# Patient Record
Sex: MALE | Race: WHITE | Employment: FULL TIME | URBAN - METROPOLITAN AREA
[De-identification: names, ages, dates, MRNs, and addresses within clinical notes are randomized per-mention and may not be internally consistent; named-entity substitution may affect disease eponyms.]

---

## 2017-09-19 PROBLEM — R10.30 GROIN PAIN: Status: ACTIVE | Noted: 2017-09-19

## 2019-12-12 ENCOUNTER — HOSPITAL ENCOUNTER (OUTPATIENT)
Age: 59
Discharge: HOME | End: 2019-12-12
Payer: COMMERCIAL

## 2019-12-12 LAB
APTT: 24.6 SECONDS (ref 25.1–37.1)
FIBRINOGEN LEVEL: 344 MG/DL (ref 196.9–442.1)
HOMOCYSTEINE: ABNORMAL UMOL/L (ref 0–10)
INR BLD: 0.88 INDEX
PROTHROMBIN TIME: 10.6 SECONDS (ref 11.7–14.5)

## 2019-12-12 PROCEDURE — 83090 ASSAY OF HOMOCYSTEINE: CPT

## 2019-12-12 PROCEDURE — 81240 F2 GENE: CPT

## 2019-12-12 PROCEDURE — 86147 CARDIOLIPIN ANTIBODY EA IG: CPT

## 2019-12-12 PROCEDURE — 81241 F5 GENE: CPT

## 2019-12-12 PROCEDURE — 85303 CLOT INHIBIT PROT C ACTIVITY: CPT

## 2019-12-12 PROCEDURE — 85305 CLOT INHIBIT PROT S TOTAL: CPT

## 2019-12-12 PROCEDURE — 85384 FIBRINOGEN ACTIVITY: CPT

## 2019-12-12 PROCEDURE — 85730 THROMBOPLASTIN TIME PARTIAL: CPT

## 2019-12-12 PROCEDURE — 81291 MTHFR GENE: CPT

## 2019-12-12 PROCEDURE — 85300 ANTITHROMBIN III ACTIVITY: CPT

## 2019-12-12 PROCEDURE — 36415 COLL VENOUS BLD VENIPUNCTURE: CPT

## 2019-12-12 PROCEDURE — 86148 ANTI-PHOSPHOLIPID ANTIBODY: CPT

## 2019-12-12 PROCEDURE — 85610 PROTHROMBIN TIME: CPT

## 2019-12-12 PROCEDURE — 85240 CLOT FACTOR VIII AHG 1 STAGE: CPT

## 2019-12-25 LAB
ANTICARDIOLIPIN IGA ANTIBODY: 0 APL (ref 0–11)
ANTICARDIOLIPIN IGA ANTIBODY: ABNORMAL APL (ref 0–11)
ANTICARDIOLIPIN IGG ANTIBODY: 7 GPL (ref 0–14)
ANTICARDIOLIPIN IGG ANTIBODY: ABNORMAL GPL (ref 0–14)
ANTITHROMBIN ACTIVITY: 119 % (ref 76–128)
ANTITHROMBIN ACTIVITY: NORMAL % (ref 76–128)
CARDIOLIPIN AB IGM: 6 MPL (ref 0–12)
CARDIOLIPIN AB IGM: ABNORMAL MPL (ref 0–12)
FACTOR V LEIDEN: NEGATIVE
FACTOR V LEIDEN: NORMAL
FACTOR VIII ACTIVITY: 258 % (ref 56–191)
FACTOR VIII ACTIVITY: ABNORMAL % (ref 56–191)
MTHFR BY PCR SPECIMEN: NORMAL
MTHFR INTERPRETATION: NORMAL
MTHFR INTERPRETATION: NORMAL
MTHFR MUTATION A1298C: NEGATIVE
MTHFR MUTATION C677T: NEGATIVE
PHOSPHATIDYLSERINE IGA ANTIBODY: 2 U/ML (ref 0–19)
PHOSPHATIDYLSERINE IGA ANTIBODY: ABNORMAL U/ML (ref 0–19)
PHOSPHATIDYLSERINE IGG ANTIBODY: 6 U/ML (ref 0–10)
PHOSPHATIDYLSERINE IGG ANTIBODY: ABNORMAL U/ML (ref 0–10)
PHOSPHATIDYLSERINE IGM ANTIBODY: 9 U/ML (ref 0–24)
PHOSPHATIDYLSERINE IGM ANTIBODY: ABNORMAL U/ML (ref 0–24)
PROTEIN C ACTIVITY: 127 % (ref 83–168)
PROTEIN C ACTIVITY: NORMAL % (ref 83–168)
PROTEIN S ACTIVITY: 77 % (ref 66–143)
PROTEIN S ACTIVITY: NORMAL % (ref 66–143)
PROTHROMBIN G20210A MUTATION: NEGATIVE
PROTHROMBIN G20210A MUTATION: NORMAL

## 2020-08-30 ENCOUNTER — APPOINTMENT (OUTPATIENT)
Dept: GENERAL RADIOLOGY | Age: 60
End: 2020-08-30
Payer: COMMERCIAL

## 2020-08-30 ENCOUNTER — HOSPITAL ENCOUNTER (EMERGENCY)
Age: 60
Discharge: HOME | End: 2020-08-30
Attending: EMERGENCY MEDICINE
Payer: COMMERCIAL

## 2020-08-30 VITALS
OXYGEN SATURATION: 96 % | WEIGHT: 170 LBS | SYSTOLIC BLOOD PRESSURE: 136 MMHG | TEMPERATURE: 98.2 F | BODY MASS INDEX: 23.03 KG/M2 | RESPIRATION RATE: 16 BRPM | HEIGHT: 72 IN | DIASTOLIC BLOOD PRESSURE: 88 MMHG | HEART RATE: 83 BPM

## 2020-08-30 PROCEDURE — 6370000000 HC RX 637 (ALT 250 FOR IP): Performed by: EMERGENCY MEDICINE

## 2020-08-30 PROCEDURE — 99283 EMERGENCY DEPT VISIT LOW MDM: CPT

## 2020-08-30 PROCEDURE — 73140 X-RAY EXAM OF FINGER(S): CPT

## 2020-08-30 RX ORDER — SULFAMETHOXAZOLE AND TRIMETHOPRIM 800; 160 MG/1; MG/1
1 TABLET ORAL ONCE
Status: COMPLETED | OUTPATIENT
Start: 2020-08-30 | End: 2020-08-30

## 2020-08-30 RX ORDER — RIVAROXABAN 20 MG/1
TABLET, FILM COATED ORAL
COMMUNITY
Start: 2020-06-03

## 2020-08-30 RX ORDER — CEPHALEXIN 500 MG/1
500 CAPSULE ORAL 4 TIMES DAILY
Qty: 28 CAPSULE | Refills: 0 | Status: SHIPPED | OUTPATIENT
Start: 2020-08-30 | End: 2020-09-06

## 2020-08-30 RX ORDER — CEPHALEXIN 500 MG/1
500 CAPSULE ORAL ONCE
Status: COMPLETED | OUTPATIENT
Start: 2020-08-30 | End: 2020-08-30

## 2020-08-30 RX ORDER — SULFAMETHOXAZOLE AND TRIMETHOPRIM 800; 160 MG/1; MG/1
1 TABLET ORAL 2 TIMES DAILY
Qty: 14 TABLET | Refills: 0 | Status: SHIPPED | OUTPATIENT
Start: 2020-08-30 | End: 2020-09-06

## 2020-08-30 RX ORDER — OLMESARTAN MEDOXOMIL 20 MG/1
5 TABLET ORAL
COMMUNITY
Start: 2019-04-12

## 2020-08-30 RX ORDER — FOLIC ACID 1 MG/1
TABLET ORAL
COMMUNITY
Start: 2020-07-18

## 2020-08-30 RX ADMIN — SULFAMETHOXAZOLE AND TRIMETHOPRIM 1 TABLET: 800; 160 TABLET ORAL at 09:09

## 2020-08-30 RX ADMIN — CEPHALEXIN 500 MG: 500 CAPSULE ORAL at 09:09

## 2020-08-30 ASSESSMENT — PAIN DESCRIPTION - PAIN TYPE
TYPE: ACUTE PAIN
TYPE: ACUTE PAIN

## 2020-08-30 ASSESSMENT — PAIN DESCRIPTION - DESCRIPTORS
DESCRIPTORS: ACHING
DESCRIPTORS: ACHING

## 2020-08-30 ASSESSMENT — PAIN DESCRIPTION - FREQUENCY
FREQUENCY: CONTINUOUS
FREQUENCY: CONTINUOUS

## 2020-08-30 ASSESSMENT — PAIN DESCRIPTION - ORIENTATION
ORIENTATION: LEFT
ORIENTATION: LEFT

## 2020-08-30 ASSESSMENT — PAIN DESCRIPTION - LOCATION
LOCATION: FINGER (COMMENT WHICH ONE)
LOCATION: FINGER (COMMENT WHICH ONE)

## 2020-08-30 NOTE — ED NOTES
The patient presents to the er today with complaints of left index finger. He denies any specific injury, but he is a . The left index finger is red and swollen.       Malaika Thomas RN  08/30/20 0900

## 2022-01-25 ENCOUNTER — HOSPITAL ENCOUNTER (OUTPATIENT)
Age: 62
Discharge: HOME | End: 2022-01-25
Payer: COMMERCIAL

## 2022-01-25 LAB
APTT: 28.7 SECONDS (ref 25.1–37.1)
FIBRINOGEN LEVEL: 281 MG/DL (ref 196.9–442.1)
INR BLD: 0.97 INDEX
PROTHROMBIN TIME: 12.5 SECONDS (ref 11.7–14.5)

## 2022-01-25 PROCEDURE — 86148 ANTI-PHOSPHOLIPID ANTIBODY: CPT

## 2022-01-25 PROCEDURE — 81241 F5 GENE: CPT

## 2022-01-25 PROCEDURE — 85610 PROTHROMBIN TIME: CPT

## 2022-01-25 PROCEDURE — 85384 FIBRINOGEN ACTIVITY: CPT

## 2022-01-25 PROCEDURE — 81291 MTHFR GENE: CPT

## 2022-01-25 PROCEDURE — 85303 CLOT INHIBIT PROT C ACTIVITY: CPT

## 2022-01-25 PROCEDURE — 81240 F2 GENE: CPT

## 2022-01-25 PROCEDURE — 85240 CLOT FACTOR VIII AHG 1 STAGE: CPT

## 2022-01-25 PROCEDURE — 86147 CARDIOLIPIN ANTIBODY EA IG: CPT

## 2022-01-25 PROCEDURE — 85300 ANTITHROMBIN III ACTIVITY: CPT

## 2022-01-25 PROCEDURE — 36415 COLL VENOUS BLD VENIPUNCTURE: CPT

## 2022-01-25 PROCEDURE — 85730 THROMBOPLASTIN TIME PARTIAL: CPT

## 2022-01-25 PROCEDURE — 85305 CLOT INHIBIT PROT S TOTAL: CPT

## 2022-01-28 LAB
ANTICARDIOLIPIN IGA ANTIBODY: <10 APL
ANTICARDIOLIPIN IGG ANTIBODY: <10 GPL
CARDIOLIPIN AB IGM: <10 MPL
FACTOR VIII ACTIVITY: 151 % (ref 56–191)
PROTEIN C ACTIVITY: 128 % (ref 83–168)
PROTEIN S ACTIVITY: 76 % (ref 66–143)

## 2022-01-29 LAB
ANTITHROMBIN ACTIVITY: 111 % (ref 76–128)
PROTHROMBIN G20210A MUTATION: NEGATIVE

## 2022-01-30 LAB
FACTOR V LEIDEN: NEGATIVE
PHOSPHATIDYLSERINE IGA ANTIBODY: 0 APS (ref 0–19)
PHOSPHATIDYLSERINE IGG ANTIBODY: 0 GPS (ref 0–15)
PHOSPHATIDYLSERINE IGM ANTIBODY: 5 MPS (ref 0–21)

## 2022-01-31 LAB
MTHFR BY PCR SPECIMEN: NORMAL
MTHFR INTERPRETATION: NORMAL
MTHFR MUTATION A1298C: NEGATIVE
MTHFR MUTATION C677T: NEGATIVE